# Patient Record
Sex: FEMALE | Race: ASIAN | URBAN - METROPOLITAN AREA
[De-identification: names, ages, dates, MRNs, and addresses within clinical notes are randomized per-mention and may not be internally consistent; named-entity substitution may affect disease eponyms.]

---

## 2022-06-30 ENCOUNTER — OFFICE VISIT (OUTPATIENT)
Dept: URBAN - METROPOLITAN AREA CLINIC 23 | Facility: CLINIC | Age: 72
End: 2022-06-30
Payer: MEDICARE

## 2022-06-30 DIAGNOSIS — H02.831 DERMATOCHALASIS OF RIGHT UPPER EYELID: Primary | ICD-10-CM

## 2022-06-30 DIAGNOSIS — H02.834 DERMATOCHALASIS OF LEFT UPPER EYELID: ICD-10-CM

## 2022-06-30 PROCEDURE — 99204 OFFICE O/P NEW MOD 45 MIN: CPT | Performed by: OPHTHALMOLOGY

## 2022-06-30 PROCEDURE — 92285 EXTERNAL OCULAR PHOTOGRAPHY: CPT | Performed by: OPHTHALMOLOGY

## 2022-06-30 ASSESSMENT — INTRAOCULAR PRESSURE
OD: 12
OS: 10

## 2022-06-30 NOTE — IMPRESSION/PLAN
Impression: Dermatochalasis of left upper eyelid: H02.834. Left. Condition: new problem addtl w/u needed. Symptoms: may improve with surgery. Vision: vision threatening. Plan: Discussion, surgery orders, risk level and pre-op instructions listed in plan #1.

## 2022-06-30 NOTE — IMPRESSION/PLAN
Impression: Dermatochalasis of right upper eyelid: H02.831. Right. Condition: new problem addtl w/u needed. Symptoms: may improve with surgery. Vision: vision threatening. Plan: Diagnosis discussed in detail. Treatment options reviewed. Discussed risks, benefits and alternatives to surgery. Patient elects to have surgical treatment. Check VF taped and untaped to assess affect on vision. Recommend bilateral upper lid blepharoplasty with lid crease formation if medically necessary. Rl2, avoid NSAIDS 7 days prior to surgery.

## 2022-07-06 ENCOUNTER — TESTING ONLY (OUTPATIENT)
Dept: URBAN - METROPOLITAN AREA CLINIC 23 | Facility: CLINIC | Age: 72
End: 2022-07-06
Payer: MEDICARE

## 2022-07-06 DIAGNOSIS — H02.831 DERMATOCHALASIS OF RIGHT UPPER EYELID: Primary | ICD-10-CM

## 2022-07-06 PROCEDURE — 92081 LIMITED VISUAL FIELD XM: CPT | Performed by: OPHTHALMOLOGY

## 2022-10-20 ENCOUNTER — POST-OPERATIVE VISIT (OUTPATIENT)
Dept: URBAN - METROPOLITAN AREA CLINIC 32 | Facility: CLINIC | Age: 72
End: 2022-10-20
Payer: MEDICARE

## 2022-10-20 DIAGNOSIS — Z48.89 ENCOUNTER FOR OTHER SPECIFIED SURGICAL AFTERCARE: Primary | ICD-10-CM

## 2022-10-20 PROCEDURE — 99024 POSTOP FOLLOW-UP VISIT: CPT | Performed by: OPHTHALMOLOGY

## 2022-10-20 NOTE — IMPRESSION/PLAN
Impression: S/P BULB OU - 13 Days. Encounter for other specified surgical aftercare  Z48.89. Excellent post op course   Post operative instructions reviewed - Condition is improving - Plan: edema will continue resolving Final photo today --Advised patient to use artificial tears for comfort.
--decrease michael to QHS to BUL